# Patient Record
Sex: FEMALE | Race: WHITE | NOT HISPANIC OR LATINO | Employment: OTHER
[De-identification: names, ages, dates, MRNs, and addresses within clinical notes are randomized per-mention and may not be internally consistent; named-entity substitution may affect disease eponyms.]

---

## 2022-07-30 ENCOUNTER — TELEPHONE ENCOUNTER (OUTPATIENT)
Age: 87
End: 2022-07-30

## 2022-07-31 ENCOUNTER — TELEPHONE ENCOUNTER (OUTPATIENT)
Age: 87
End: 2022-07-31

## 2022-08-05 ENCOUNTER — P2P PATIENT RECORD (OUTPATIENT)
Age: 87
End: 2022-08-05

## 2022-08-31 ENCOUNTER — OFFICE VISIT (OUTPATIENT)
Dept: URBAN - METROPOLITAN AREA CLINIC 63 | Facility: CLINIC | Age: 87
End: 2022-08-31
Payer: MEDICARE

## 2022-08-31 ENCOUNTER — WEB ENCOUNTER (OUTPATIENT)
Dept: URBAN - METROPOLITAN AREA CLINIC 63 | Facility: CLINIC | Age: 87
End: 2022-08-31

## 2022-08-31 ENCOUNTER — LAB OUTSIDE AN ENCOUNTER (OUTPATIENT)
Dept: URBAN - METROPOLITAN AREA CLINIC 63 | Facility: CLINIC | Age: 87
End: 2022-08-31

## 2022-08-31 ENCOUNTER — DASHBOARD ENCOUNTERS (OUTPATIENT)
Age: 87
End: 2022-08-31

## 2022-08-31 VITALS
HEART RATE: 60 BPM | TEMPERATURE: 98.1 F | BODY MASS INDEX: 25.32 KG/M2 | DIASTOLIC BLOOD PRESSURE: 58 MMHG | HEIGHT: 60 IN | WEIGHT: 129 LBS | SYSTOLIC BLOOD PRESSURE: 105 MMHG | OXYGEN SATURATION: 92 %

## 2022-08-31 DIAGNOSIS — E61.1 IRON DEFICIENCY: ICD-10-CM

## 2022-08-31 DIAGNOSIS — K21.00 GASTRO-ESOPHAGEAL REFLUX DISEASE WITH ESOPHAGITIS, WITHOUT BLEEDING: ICD-10-CM

## 2022-08-31 PROCEDURE — 99204 OFFICE O/P NEW MOD 45 MIN: CPT | Performed by: NURSE PRACTITIONER

## 2022-08-31 RX ORDER — ROSUVASTATIN CALCIUM 20 MG/1
1 TABLET TABLET, FILM COATED ORAL ONCE A DAY
Status: ACTIVE | COMMUNITY

## 2022-08-31 RX ORDER — KRILL/OM-3/DHA/EPA/PHOSPHO/AST 1000-230MG
1 TABLET CAPSULE ORAL ONCE A DAY
Status: ACTIVE | COMMUNITY

## 2022-08-31 RX ORDER — NITROGLYCERIN 0.4 MG/1
AS DIRECTED TABLET SUBLINGUAL
Status: ACTIVE | COMMUNITY

## 2022-08-31 RX ORDER — GUAIFENESIN 200 MG/1
1 TABLET AS NEEDED TABLET ORAL
Status: ACTIVE | COMMUNITY

## 2022-08-31 RX ORDER — LEVALBUTEROL TARTRATE 45 UG/1
1 PUFF AS NEEDED AEROSOL, METERED ORAL
Status: ACTIVE | COMMUNITY

## 2022-08-31 RX ORDER — FLUTICASONE FUROATE, UMECLIDINIUM BROMIDE AND VILANTEROL TRIFENATATE 100; 62.5; 25 UG/1; UG/1; UG/1
1 PUFF POWDER RESPIRATORY (INHALATION) ONCE A DAY
Status: ACTIVE | COMMUNITY

## 2022-08-31 RX ORDER — VILAZODONE HYDROCHLORIDE 40 MG/1
1 TABLET WITH FOOD TABLET, FILM COATED ORAL ONCE A DAY
Status: ACTIVE | COMMUNITY

## 2022-08-31 RX ORDER — OMEPRAZOLE 40 MG/1
1 CAPSULE 30 MINUTES BEFORE MORNING MEAL CAPSULE, DELAYED RELEASE ORAL ONCE A DAY
Qty: 30 | OUTPATIENT
Start: 2022-09-01

## 2022-08-31 RX ORDER — GABAPENTIN 300 MG/1
1 CAPSULE CAPSULE ORAL ONCE A DAY
Status: ACTIVE | COMMUNITY

## 2022-08-31 RX ORDER — MEMANTINE HYDROCHLORIDE 5 MG/1
1 TABLET TABLET ORAL ONCE A DAY
Status: ACTIVE | COMMUNITY

## 2022-08-31 RX ORDER — RIVASTIGMINE 4.6 MG/24H
1 PATCH TO SKIN PATCH, EXTENDED RELEASE TRANSDERMAL ONCE A DAY
Status: ACTIVE | COMMUNITY

## 2022-08-31 RX ORDER — LEVOTHYROXINE SODIUM 25 UG/1
1 TABLET IN THE MORNING ON AN EMPTY STOMACH TABLET ORAL ONCE A DAY
Status: ACTIVE | COMMUNITY

## 2022-08-31 NOTE — HPI-TODAY'S VISIT:
Thank you very much for kindly referring Mecca Nash, a very pleasant 89-year-old female, to our service for GERD and iron deficiency.  Past medical history significant for HTN, HLD, hypothyroidism, nephrolithiasis, COPD, pneumonia and GERD.  Past surgical history is significant for cholecystectomy, small bowel resection, splenectomy, hysterectomy and tonsillectomy.  Her last complete colonoscopy was 14 September 2016 and was only significant for diverticulosis.  Mecca presents today complaining of "heartburn for years" but only uses Tums for symptom relief.  She does state use approximately 3 times a week and is otherwise asymptomatic from a GI perspective.  She has not seen any bright red blood per rectum, maroonish stool or melena.  She denies dysphagia, dyspepsia, abdominal pain, change in bowel habits, rectal pain or unintentional weight loss.

## 2022-08-31 NOTE — HPI-PREVIOUS LABS
Lab work dated 05 August 2022 demonstrates the following abnormalities: TIBC 452, UIBC 427, serum iron 25, iron saturation 6%, ferritin 6, transferrin 366.  All remaining lab values of haptoglobin are within normal limits.

## 2022-09-01 PROBLEM — 266433003: Status: ACTIVE | Noted: 2022-08-31
